# Patient Record
Sex: FEMALE | Race: BLACK OR AFRICAN AMERICAN | NOT HISPANIC OR LATINO | Employment: FULL TIME | URBAN - NONMETROPOLITAN AREA
[De-identification: names, ages, dates, MRNs, and addresses within clinical notes are randomized per-mention and may not be internally consistent; named-entity substitution may affect disease eponyms.]

---

## 2023-12-19 ENCOUNTER — OFFICE VISIT (OUTPATIENT)
Dept: OTOLARYNGOLOGY | Facility: CLINIC | Age: 38
End: 2023-12-19
Payer: COMMERCIAL

## 2023-12-19 DIAGNOSIS — J34.3 NASAL TURBINATE HYPERTROPHY: Primary | ICD-10-CM

## 2023-12-19 DIAGNOSIS — J34.2 DEVIATED NASAL SEPTUM: ICD-10-CM

## 2023-12-19 PROCEDURE — 31231 NASAL ENDOSCOPY DX: CPT | Performed by: OTOLARYNGOLOGY

## 2023-12-19 PROCEDURE — 99204 OFFICE O/P NEW MOD 45 MIN: CPT | Performed by: OTOLARYNGOLOGY

## 2023-12-19 RX ORDER — FLUTICASONE PROPIONATE 50 MCG
2 SPRAY, SUSPENSION (ML) NASAL DAILY
Qty: 16 G | Refills: 3 | Status: SHIPPED | OUTPATIENT
Start: 2023-12-19 | End: 2024-01-12

## 2023-12-19 NOTE — PROGRESS NOTES
History Of Present Illness    Jennifer Mathews is a 38 y.o. female presenting with stuffy nose, sinus issues. She is a self referral. These have been going on for the past 3-4 years. She has tried using roderick pot, and a humidifier in her room. She is congested every night and every morning she is blowing blood out of her nose. She takes zyrtec or claritin almost every day.     Sinus pressure (+)  She snores at night.     On examination, nasal septum is deviated to the left touching left inferior turbinate. There were dried secretions at anterior part of nasal cavity. Inactive bleeding spot (+).    Dx:  1- nasal septum deviation  2- nasal turbinate congestion-hypertrophy    Recommendations:  1- try fluticasone nasal spray   2- coconut oil for dryness at the entrance of nose and for nosebleeds  3- consider septoplasty and radiofrequency turbinate reduction or radiofrequency alone     Past Medical History  She has no past medical history on file.    Surgical History  She has no past surgical history on file.     Social History  She has no history on file for tobacco use, alcohol use, and drug use.    Family History  No family history on file.     Allergies  Patient has no allergy information on record.    Review of Systems   Feeling tired  Sinus Pressure  Nasal Blockage  Snoring  Postnasal Drip  Dry Mouth/ Mouth Breathing  Palpitations  Headache     Physical Exam    General appearance: Healthy-appearing, well-nourished, well groomed, in no acute distress.     Head and Face: Atraumatic with no masses, lesions, or scarring.      Salivary glands: No tenderness of the parotid glands or parotid masses.     No tenderness of the submandibular glands or submandibular masses.      Facial strength: Normal strength and symmetry, no synkinesis or facial tic.     Eyes: Conjunctivas look non-hyperemic bilaterally    Ears: Bilaterally ear canals look normal. Tympanic membranes look intact, no hyperemia, fluid or retraction. Hearing  grossly normal.      Nose: Please see endoscopy    Oral Cavity/Mouth: Lips and tongue look normal.     Throat: No postnasal discharge. No tonsil hypertrophy. No hyperemia.    Neck: Symmetrical, trachea midline.     Pulmonary: Normal respiratory effort.     Lymphatic: No palpable pathologic lymph nodes at neck.     Neurological/Psychiatric Orientation to person, place, and time: Normal.     Mood and affect: Normal.      Extremities: No clubbing.     Skin: No significant skin lesions were noted at face or neck      Procedure  NASAL ENDOSCOPY   0 degree nasal endoscope was advanced through patient's nasal cavities. On examination, nasal septum is deviated to the left touching left inferior turbinate. There were dried secretions at anterior part of nasal cavity. Inactive bleeding spot (+). Mucosa looked normal.  No polyps or purulent secretions were observed.     Last Recorded Vitals  There were no vitals taken for this visit.    Relevant Results  Prior to Admission medications    Not on File     No results found.      Assessment/Plan   Jennifer Mathews is a 38 y.o. female presenting with stuffy nose, sinus issues. She is a self referral. These have been going on for the past 3-4 years. She has tried using roderick pot, and a humidifier in her room. She is congested every night and every morning she is blowing blood out of her nose. She takes zyrtec or claritin almost every day.     Sinus pressure (+)  She snores at night.     On examination, nasal septum is deviated to the left touching left inferior turbinate. There were dried secretions at anterior part of nasal cavity. Inactive bleeding spot (+).    Dx:  1- nasal septum deviation  2- nasal turbinate congestion-hypertrophy    Recommendations:  1- try fluticasone nasal spray   2- coconut oil for dryness at the entrance of nose and for nosebleeds  3- consider septoplasty and radiofrequency turbinate reduction or radiofrequency alone      Marilu Chacon  Otolaryngology -  Head & Neck Surgery

## 2024-01-10 DIAGNOSIS — J34.3 NASAL TURBINATE HYPERTROPHY: ICD-10-CM

## 2024-01-10 DIAGNOSIS — J34.2 DEVIATED NASAL SEPTUM: ICD-10-CM

## 2024-01-12 RX ORDER — FLUTICASONE PROPIONATE 50 MCG
2 SPRAY, SUSPENSION (ML) NASAL DAILY
Qty: 16 ML | Refills: 2 | Status: SHIPPED | OUTPATIENT
Start: 2024-01-12 | End: 2024-05-03 | Stop reason: WASHOUT

## 2024-01-22 ENCOUNTER — APPOINTMENT (OUTPATIENT)
Dept: RADIOLOGY | Facility: HOSPITAL | Age: 39
End: 2024-01-22
Payer: COMMERCIAL

## 2024-01-22 ENCOUNTER — HOSPITAL ENCOUNTER (EMERGENCY)
Facility: HOSPITAL | Age: 39
Discharge: HOME | End: 2024-01-22
Attending: EMERGENCY MEDICINE
Payer: COMMERCIAL

## 2024-01-22 VITALS
BODY MASS INDEX: 33.33 KG/M2 | HEIGHT: 69 IN | OXYGEN SATURATION: 100 % | RESPIRATION RATE: 18 BRPM | WEIGHT: 225 LBS | DIASTOLIC BLOOD PRESSURE: 79 MMHG | SYSTOLIC BLOOD PRESSURE: 136 MMHG | HEART RATE: 70 BPM

## 2024-01-22 DIAGNOSIS — S29.019A THORACIC MYOFASCIAL STRAIN, INITIAL ENCOUNTER: ICD-10-CM

## 2024-01-22 DIAGNOSIS — S39.012A ACUTE MYOFASCIAL STRAIN OF LUMBAR REGION, INITIAL ENCOUNTER: ICD-10-CM

## 2024-01-22 DIAGNOSIS — S16.1XXA CERVICAL STRAIN, ACUTE, INITIAL ENCOUNTER: ICD-10-CM
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
## 2024-01-22 PROCEDURE — 70450 CT HEAD/BRAIN W/O DYE: CPT

## 2024-01-22 PROCEDURE — 72128 CT CHEST SPINE W/O DYE: CPT

## 2024-01-22 PROCEDURE — 72125 CT NECK SPINE W/O DYE: CPT | Performed by: RADIOLOGY

## 2024-01-22 PROCEDURE — 72131 CT LUMBAR SPINE W/O DYE: CPT

## 2024-01-22 PROCEDURE — 72128 CT CHEST SPINE W/O DYE: CPT | Performed by: RADIOLOGY

## 2024-01-22 PROCEDURE — 70450 CT HEAD/BRAIN W/O DYE: CPT | Performed by: RADIOLOGY

## 2024-01-22 PROCEDURE — 99285 EMERGENCY DEPT VISIT HI MDM: CPT | Mod: 25,27

## 2024-01-22 PROCEDURE — 99285 EMERGENCY DEPT VISIT HI MDM: CPT | Performed by: EMERGENCY MEDICINE

## 2024-01-22 PROCEDURE — 72125 CT NECK SPINE W/O DYE: CPT

## 2024-01-22 PROCEDURE — 2500000001 HC RX 250 WO HCPCS SELF ADMINISTERED DRUGS (ALT 637 FOR MEDICARE OP): Performed by: EMERGENCY MEDICINE

## 2024-01-22 RX ORDER — IBUPROFEN 600 MG/1
600 TABLET ORAL EVERY 8 HOURS PRN
Qty: 30 TABLET | Refills: 0 | Status: SHIPPED | OUTPATIENT
Start: 2024-01-22 | End: 2024-05-03 | Stop reason: WASHOUT

## 2024-01-22 RX ORDER — LOSARTAN POTASSIUM 25 MG/1
12.5 TABLET ORAL DAILY
COMMUNITY
End: 2024-05-03 | Stop reason: WASHOUT

## 2024-01-22 RX ORDER — IBUPROFEN 600 MG/1
600 TABLET ORAL ONCE
Status: COMPLETED | OUTPATIENT
Start: 2024-01-22 | End: 2024-01-22

## 2024-01-22 RX ADMIN — IBUPROFEN 600 MG: 600 TABLET ORAL at 19:25

## 2024-01-22 ASSESSMENT — PAIN SCALES - GENERAL
PAINLEVEL_OUTOF10: 7
PAINLEVEL_OUTOF10: 5 - MODERATE PAIN

## 2024-01-22 ASSESSMENT — COLUMBIA-SUICIDE SEVERITY RATING SCALE - C-SSRS
6. HAVE YOU EVER DONE ANYTHING, STARTED TO DO ANYTHING, OR PREPARED TO DO ANYTHING TO END YOUR LIFE?: NO
2. HAVE YOU ACTUALLY HAD ANY THOUGHTS OF KILLING YOURSELF?: NO
1. IN THE PAST MONTH, HAVE YOU WISHED YOU WERE DEAD OR WISHED YOU COULD GO TO SLEEP AND NOT WAKE UP?: NO

## 2024-01-22 ASSESSMENT — PAIN - FUNCTIONAL ASSESSMENT: PAIN_FUNCTIONAL_ASSESSMENT: 0-10
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
## 2024-01-23 NOTE — DISCHARGE INSTRUCTIONS
Imaging of the head, C-spine, T-spine, L-spine is unremarkable.  No acute bony injury.  You may take Tylenol and/or ibuprofen as needed for pain.  Use ice or heat as needed for pain.  Follow-up with primary care in 3 to 5 days as needed,

## 2024-04-19 PROBLEM — E66.812 OBESITY, CLASS II, BMI 35-39.9: Status: ACTIVE | Noted: 2023-10-02

## 2024-04-19 PROBLEM — J34.2 DEVIATED NASAL SEPTUM: Status: ACTIVE | Noted: 2024-04-19

## 2024-04-19 PROBLEM — I10 HYPERTENSION: Status: ACTIVE | Noted: 2024-04-19

## 2024-04-19 PROBLEM — J34.3 HYPERTROPHY OF NASAL TURBINATES: Status: ACTIVE | Noted: 2024-04-19

## 2024-04-19 PROBLEM — R73.03 PREDIABETES: Status: ACTIVE | Noted: 2024-04-19

## 2024-04-19 PROBLEM — E78.5 DYSLIPIDEMIA: Status: ACTIVE | Noted: 2024-04-19

## 2024-04-19 PROBLEM — E66.9 OBESITY, CLASS II, BMI 35-39.9: Status: ACTIVE | Noted: 2023-10-02

## 2024-04-19 NOTE — PROGRESS NOTES
Subjective   Patient ID: Jennifer Mathews is a 38 y.o. female who presents for Establish Care.    HPI   ~Pt here to establish.  She has htn and is on meds.   ~She also has prediabetes.  At home readings are 120-130/80's.  Doing exercising and watching the carbs/starches.    ~She's had issues with her weight : was given phentermine in the past but it caused palpitations and it didn't work.  Is eating 2732-6131 calories a day; goes to gym for 60 min 4x/wk with treadmill, weight, stair stepper activities.  Works from home so is sedentary while working.  ~Has also had some palpitations on and off for awhile: caffeine makes it worse; isn't sleeping very well; tsh checked? Had holter monitor x 72 hrs some time back but it didn't catch anything    Past Medical History:   Diagnosis Date    Dyslipidemia     Hypertension     Prediabetes      Current Outpatient Medications   Medication Sig Dispense Refill    losartan-hydrochlorothiazide (Hyzaar) 50-12.5 mg tablet Take 1 tablet by mouth once daily.       No current facility-administered medications for this visit.       Review of Systems   Constitutional: Negative.  Negative for activity change, fatigue and unexpected weight change.   HENT: Negative.     Respiratory: Negative.     Cardiovascular:  Positive for palpitations.   Gastrointestinal: Negative.    Musculoskeletal: Negative.    Neurological: Negative.        Objective   /68   Pulse 72   Wt 113 kg (249 lb 9.6 oz)   LMP 04/25/2024   SpO2 99%   BMI 36.86 kg/m²   LABS:  Lab Results   Component Value Date    HGBA1C 5.9 (H) 10/02/2023   LIPID PANEL BASIC  Order: [id number]  Component  Ref Range & Units 3 mo ago   Cholesterol, Total  <200 mg/dL 166   Comment: <200 mg/dL, Desirable   200-239 mg/dL, Borderline high  >239 mg/dL, High   Triglyceride  <150 mg/dL 171 High    Comment: <150 mg/dL, Normal   150-199 mg/dL, Borderline high   200-499 mg/dL, High  >499 mg/dL, Very high   HDL Cholesterol  >39 mg/dL 39 Low     Comment:  40-59 mg/dL, Acceptable  >59 mg/dL, High: Negative risk factor for coronary heart disease  <40 mg/dL, Low: Positive risk factor for coronary heart disease   Non HDL Cholesterol  <130 mg/dL 127   Comment: <130 mg/dL, Optimal   130-159 mg/dL, Near optimal/above optimal   160-189 mg/dL, Borderline high   190-219 mg/dL, High  >219 mg/dL, Very high  Secondary prevention optimal non HDL Cholesterol levels are recommended to be <100 mg/dL   Fasting Time  hrs 0   VLDL Cholesterol  <30 mg/dL 34 High    TC:HDL Ratio  <5.10 4.26   LDL Cholesterol  <100 mg/dL 93   Comment: <100 mg/dL, Optimal   100-129 mg/dL, Near optimal/above optimal   130-159 mg/dL, Borderline high   160-189 mg/dL, High  >189 mg/dL, Very high  Secondary prevention optimal LDL Cholesterol levels are recommended to be < 70 mg/dL   LDL:HDL Ratio  <2.54 2.38   COMP METABOLIC PANEL  Order: [id number]  Component  Ref Range & Units 6 mo ago   Protein, Total  6.3 - 8.0 g/dL 7.5   Albumin  3.9 - 4.9 g/dL 4.8   Calcium, Total  8.5 - 10.2 mg/dL 9.7   Bilirubin, Total  0.2 - 1.3 mg/dL 0.5   Alkaline Phosphatase  34 - 123 U/L 85   AST  13 - 35 U/L 14   ALT  7 - 38 U/L 11   Glucose  74 - 99 mg/dL 86   Comment: The American Diabetes Association (ADA) provides guidance for cutoff values for fasting glucose and random glucose. The ADA defines fasting as no caloric intake for at least 8 hours. Fasting plasma glucose results between 100 to 125 mg/dL indicate increased risk for diabetes (prediabetes).  Fasting plasma glucose results greater than or equal to 126 mg/dL meet the criteria for diagnosis of diabetes. In the absence of unequivocal hyperglycemia, results should be confirmed by repeat testing. In a patient with classic symptoms of hyperglycemia or hyperglycemic crisis, random plasma glucose results greater than or equal to 200 mg/dL meet the criteria for diagnosis of diabetes.  Reference: Standards of Medical Care in Diabetes 2016, American Diabetes  Association. Diabetes Care. 2016.39(Suppl 1).   BUN  7 - 21 mg/dL 9   Creatinine  0.58 - 0.96 mg/dL 0.67   Sodium  136 - 144 mmol/L 140   Potassium  3.7 - 5.1 mmol/L 3.8   Chloride  97 - 105 mmol/L 102   CO2  22 - 30 mmol/L 24   Anion Gap  9 - 18 mmol/L 14   Estimated Glomerular Filtration Rate        Physical Exam  Vitals reviewed.   Constitutional:       Appearance: Normal appearance.   HENT:      Head: Normocephalic and atraumatic.      Right Ear: Tympanic membrane, ear canal and external ear normal.      Left Ear: Tympanic membrane, ear canal and external ear normal.      Mouth/Throat:      Mouth: Mucous membranes are moist.   Eyes:      Pupils: Pupils are equal, round, and reactive to light.   Cardiovascular:      Rate and Rhythm: Normal rate and regular rhythm.      Heart sounds: Normal heart sounds.   Pulmonary:      Effort: Pulmonary effort is normal.      Breath sounds: Normal breath sounds.   Musculoskeletal:         General: Normal range of motion.      Cervical back: Normal range of motion.   Skin:     General: Skin is warm.   Neurological:      General: No focal deficit present.      Mental Status: She is alert and oriented to person, place, and time.      Deep Tendon Reflexes: Reflexes are normal and symmetric.   Psychiatric:         Mood and Affect: Mood normal.         Behavior: Behavior normal.         Assessment/Plan   Problem List Items Addressed This Visit             ICD-10-CM       Medium    Dyslipidemia E78.5    Hypertension - Primary I10    Relevant Orders    Follow Up In Primary Care - Established    Prediabetes R73.03     Other Visit Diagnoses         Codes    Palpitations     R00.2    if labs are normal. will refer her for event monitor/zio patch since palpitations aren't daily or even weekly; avoid caffeine; get 6-8 hrs sleep/night     Relevant Orders    TSH with reflex to Free T4 if abnormal    CBC    Basic Metabolic Panel    Magnesium    Weight gain     R63.5    d/w pt to cont gym  visits; eat 2677-1457 calories a day; use calorie tracker on phone     Relevant Orders    TSH with reflex to Free T4 if abnormal          Cont diet /exercise for lipids and prediabetes.  Cont losartin/hctz for htn.

## 2024-05-03 ENCOUNTER — OFFICE VISIT (OUTPATIENT)
Dept: PRIMARY CARE | Facility: CLINIC | Age: 39
End: 2024-05-03
Payer: COMMERCIAL

## 2024-05-03 VITALS
DIASTOLIC BLOOD PRESSURE: 68 MMHG | HEART RATE: 72 BPM | SYSTOLIC BLOOD PRESSURE: 134 MMHG | WEIGHT: 249.6 LBS | BODY MASS INDEX: 36.86 KG/M2 | OXYGEN SATURATION: 99 %

## 2024-05-03 DIAGNOSIS — R00.2 PALPITATIONS: ICD-10-CM

## 2024-05-03 DIAGNOSIS — R73.03 PREDIABETES: ICD-10-CM

## 2024-05-03 DIAGNOSIS — I10 PRIMARY HYPERTENSION: Primary | ICD-10-CM

## 2024-05-03 DIAGNOSIS — R63.5 WEIGHT GAIN: ICD-10-CM

## 2024-05-03 DIAGNOSIS — E78.5 DYSLIPIDEMIA: ICD-10-CM

## 2024-05-03 PROCEDURE — 3078F DIAST BP <80 MM HG: CPT | Performed by: FAMILY MEDICINE

## 2024-05-03 PROCEDURE — 3075F SYST BP GE 130 - 139MM HG: CPT | Performed by: FAMILY MEDICINE

## 2024-05-03 PROCEDURE — 99204 OFFICE O/P NEW MOD 45 MIN: CPT | Performed by: FAMILY MEDICINE

## 2024-05-03 PROCEDURE — 1036F TOBACCO NON-USER: CPT | Performed by: FAMILY MEDICINE

## 2024-05-03 RX ORDER — PHENTERMINE HYDROCHLORIDE 15 MG/1
CAPSULE ORAL
COMMUNITY
Start: 2024-01-16 | End: 2024-05-03 | Stop reason: WASHOUT

## 2024-05-03 RX ORDER — BUDESONIDE AND FORMOTEROL FUMARATE DIHYDRATE 160; 4.5 UG/1; UG/1
AEROSOL RESPIRATORY (INHALATION)
COMMUNITY
Start: 2023-11-14 | End: 2024-05-03 | Stop reason: WASHOUT

## 2024-05-03 RX ORDER — LOSARTAN POTASSIUM AND HYDROCHLOROTHIAZIDE 12.5; 5 MG/1; MG/1
1 TABLET ORAL DAILY
COMMUNITY

## 2024-05-03 RX ORDER — AMLODIPINE BESYLATE 10 MG/1
10 TABLET ORAL DAILY
COMMUNITY
Start: 2023-12-13 | End: 2024-05-03 | Stop reason: WASHOUT

## 2024-05-03 ASSESSMENT — PATIENT HEALTH QUESTIONNAIRE - PHQ9
SUM OF ALL RESPONSES TO PHQ9 QUESTIONS 1 AND 2: 0
1. LITTLE INTEREST OR PLEASURE IN DOING THINGS: NOT AT ALL
2. FEELING DOWN, DEPRESSED OR HOPELESS: NOT AT ALL

## 2024-05-03 ASSESSMENT — ENCOUNTER SYMPTOMS
RESPIRATORY NEGATIVE: 1
ACTIVITY CHANGE: 0
GASTROINTESTINAL NEGATIVE: 1
NEUROLOGICAL NEGATIVE: 1
FATIGUE: 0
MUSCULOSKELETAL NEGATIVE: 1
PALPITATIONS: 1
UNEXPECTED WEIGHT CHANGE: 0
CONSTITUTIONAL NEGATIVE: 1

## 2024-05-03 ASSESSMENT — PAIN SCALES - GENERAL: PAINLEVEL: 0-NO PAIN

## 2024-05-21 NOTE — PROGRESS NOTES
Subjective   Patient ID: Jennifer Mathews is a 39 y.o. female who presents for Numbness (Left arm numbness for a few weeks ).    HPI   Pt comes in c.o of left arm numbness; 4-5 days ago she woke up with left arm numb since she had been sleeping on her left side.  She did that to avoid disturbing her partner who had just had wrist surgery.  Arm still feels weak and gets intermittent pain. The numbness is at night though she isn't sleeping on her left side.  The pain is described as a dull ache.  No dropping of items.    Past Medical History:   Diagnosis Date    Dyslipidemia     Hypertension     Prediabetes      Current Outpatient Medications   Medication Sig Dispense Refill    losartan-hydrochlorothiazide (Hyzaar) 50-12.5 mg tablet Take 1 tablet by mouth once daily.      methylPREDNISolone (Medrol Dospak) 4 mg tablets Take each day's dose once a day in the AM with food 1 each 0     No current facility-administered medications for this visit.       Review of Systems see hpi    Objective   /72   Pulse 70   Wt 112 kg (247 lb)   LMP 04/25/2024   SpO2 99%   BMI 36.48 kg/m²     Physical Exam  Vitals reviewed.   Constitutional:       Appearance: Normal appearance.   Musculoskeletal:      Left shoulder: Tenderness present. No swelling. Normal range of motion. Decreased strength.      Right hand: Normal.      Left hand: Normal.      Cervical back: Normal.      Comments: No pain with hawkin's; pain with empty can test, external rotation, lift off test.   Neurological:      Mental Status: She is alert.      Deep Tendon Reflexes: Reflexes are normal and symmetric.      Comments: Normal  bilaterally         Assessment/Plan   Problem List Items Addressed This Visit    None  Visit Diagnoses         Codes    Chronic left shoulder pain    -  Primary M25.512, G89.29    Relevant Medications    methylPREDNISolone (Medrol Dospak) 4 mg tablets    methylPREDNISolone acetate (DEPO-Medrol) injection 80 mg (Completed)     Other Relevant Orders    XR shoulder left 2+ views

## 2024-05-24 ENCOUNTER — HOSPITAL ENCOUNTER (OUTPATIENT)
Dept: RADIOLOGY | Facility: CLINIC | Age: 39
Discharge: HOME | End: 2024-05-24
Payer: COMMERCIAL

## 2024-05-24 ENCOUNTER — OFFICE VISIT (OUTPATIENT)
Dept: PRIMARY CARE | Facility: CLINIC | Age: 39
End: 2024-05-24
Payer: COMMERCIAL

## 2024-05-24 VITALS
OXYGEN SATURATION: 99 % | WEIGHT: 247 LBS | HEART RATE: 70 BPM | DIASTOLIC BLOOD PRESSURE: 72 MMHG | SYSTOLIC BLOOD PRESSURE: 126 MMHG | BODY MASS INDEX: 36.48 KG/M2

## 2024-05-24 DIAGNOSIS — G89.29 CHRONIC LEFT SHOULDER PAIN: Primary | ICD-10-CM

## 2024-05-24 DIAGNOSIS — G89.29 CHRONIC LEFT SHOULDER PAIN: ICD-10-CM

## 2024-05-24 DIAGNOSIS — M25.512 CHRONIC LEFT SHOULDER PAIN: ICD-10-CM

## 2024-05-24 DIAGNOSIS — M25.512 CHRONIC LEFT SHOULDER PAIN: Primary | ICD-10-CM

## 2024-05-24 PROCEDURE — 99213 OFFICE O/P EST LOW 20 MIN: CPT | Performed by: FAMILY MEDICINE

## 2024-05-24 PROCEDURE — 73030 X-RAY EXAM OF SHOULDER: CPT | Mod: LEFT SIDE | Performed by: RADIOLOGY

## 2024-05-24 PROCEDURE — 3074F SYST BP LT 130 MM HG: CPT | Performed by: FAMILY MEDICINE

## 2024-05-24 PROCEDURE — 96372 THER/PROPH/DIAG INJ SC/IM: CPT | Performed by: FAMILY MEDICINE

## 2024-05-24 PROCEDURE — 73030 X-RAY EXAM OF SHOULDER: CPT | Mod: LT

## 2024-05-24 PROCEDURE — 2500000004 HC RX 250 GENERAL PHARMACY W/ HCPCS (ALT 636 FOR OP/ED): Performed by: FAMILY MEDICINE

## 2024-05-24 PROCEDURE — 3078F DIAST BP <80 MM HG: CPT | Performed by: FAMILY MEDICINE

## 2024-05-24 PROCEDURE — 1036F TOBACCO NON-USER: CPT | Performed by: FAMILY MEDICINE

## 2024-05-24 RX ORDER — METHYLPREDNISOLONE ACETATE 80 MG/ML
80 INJECTION, SUSPENSION INTRA-ARTICULAR; INTRALESIONAL; INTRAMUSCULAR; SOFT TISSUE ONCE
Status: COMPLETED | OUTPATIENT
Start: 2024-05-24 | End: 2024-05-24

## 2024-05-24 RX ORDER — METHYLPREDNISOLONE 4 MG/1
TABLET ORAL
Qty: 1 EACH | Refills: 0 | Status: SHIPPED | OUTPATIENT
Start: 2024-05-24

## 2024-05-24 RX ADMIN — METHYLPREDNISOLONE ACETATE 80 MG: 80 INJECTION, SUSPENSION INTRA-ARTICULAR; INTRALESIONAL; INTRAMUSCULAR; SOFT TISSUE at 11:06

## 2024-05-24 ASSESSMENT — PATIENT HEALTH QUESTIONNAIRE - PHQ9
2. FEELING DOWN, DEPRESSED OR HOPELESS: NOT AT ALL
1. LITTLE INTEREST OR PLEASURE IN DOING THINGS: NOT AT ALL
SUM OF ALL RESPONSES TO PHQ9 QUESTIONS 1 AND 2: 0

## 2024-05-24 ASSESSMENT — PAIN SCALES - GENERAL: PAINLEVEL: 0-NO PAIN

## 2024-05-27 DIAGNOSIS — G89.29 CHRONIC LEFT SHOULDER PAIN: Primary | ICD-10-CM

## 2024-05-27 DIAGNOSIS — M25.512 CHRONIC LEFT SHOULDER PAIN: Primary | ICD-10-CM

## 2024-07-08 NOTE — PROGRESS NOTES
dsdSubjective   Patient ID: Jennifer Mathews is a 39 y.o. female who presents for Rapid Heart Rate.    HPI   Pt comes in due to noticing elevated HR and palpitations  at random times:  while out of town 117  4 days ago.  Occurred on Friday and y'day just sitting on couch.  No excess caffeine.    Past visits show pulse in 70's.  She was seen in May for htn, palpitations but did not get labwork that was ordered (bmp, cbc, tsh, magnesium ).  She was going to be referred for event monitor if lab work came back normal since previous 72 hr holter did not catch anything.  She has been avoiding caffeine due to palpitations    Past Medical History:   Diagnosis Date    Dyslipidemia     Hypertension     Prediabetes      Current Outpatient Medications   Medication Sig Dispense Refill    losartan-hydrochlorothiazide (Hyzaar) 50-12.5 mg tablet Take 1 tablet by mouth once daily.       No current facility-administered medications for this visit.   '  Review of Systems see hpi    Objective   /66   Pulse 95   Wt 115 kg (252 lb 12.8 oz)   SpO2 99%   BMI 37.33 kg/m²     Physical Exam  Vitals reviewed.   Constitutional:       Appearance: Normal appearance.   Cardiovascular:      Rate and Rhythm: Normal rate and regular rhythm.      Heart sounds: Normal heart sounds.   Pulmonary:      Effort: Pulmonary effort is normal.      Breath sounds: Normal breath sounds.   Neurological:      Mental Status: She is alert.         Assessment/Plan   Problem List Items Addressed This Visit    None  Visit Diagnoses         Codes    Tachycardia    -  Primary R00.0    pt to get bloodwork done that was ordered back in May and again if normal, will send for event monitor or start beta blocker for both htn/tachy/palp tx     Palpitations     R00.2

## 2024-07-09 ENCOUNTER — OFFICE VISIT (OUTPATIENT)
Dept: PRIMARY CARE | Facility: CLINIC | Age: 39
End: 2024-07-09
Payer: COMMERCIAL

## 2024-07-09 VITALS
OXYGEN SATURATION: 99 % | WEIGHT: 252.8 LBS | SYSTOLIC BLOOD PRESSURE: 122 MMHG | BODY MASS INDEX: 37.33 KG/M2 | DIASTOLIC BLOOD PRESSURE: 66 MMHG | HEART RATE: 95 BPM

## 2024-07-09 DIAGNOSIS — R00.2 PALPITATIONS: ICD-10-CM

## 2024-07-09 DIAGNOSIS — R00.0 TACHYCARDIA: Primary | ICD-10-CM

## 2024-07-09 PROCEDURE — 1036F TOBACCO NON-USER: CPT | Performed by: FAMILY MEDICINE

## 2024-07-09 PROCEDURE — 99213 OFFICE O/P EST LOW 20 MIN: CPT | Performed by: FAMILY MEDICINE

## 2024-07-09 PROCEDURE — 3078F DIAST BP <80 MM HG: CPT | Performed by: FAMILY MEDICINE

## 2024-07-09 PROCEDURE — 3074F SYST BP LT 130 MM HG: CPT | Performed by: FAMILY MEDICINE

## 2024-07-09 ASSESSMENT — PATIENT HEALTH QUESTIONNAIRE - PHQ9
1. LITTLE INTEREST OR PLEASURE IN DOING THINGS: NOT AT ALL
SUM OF ALL RESPONSES TO PHQ9 QUESTIONS 1 AND 2: 0
2. FEELING DOWN, DEPRESSED OR HOPELESS: NOT AT ALL

## 2024-07-09 ASSESSMENT — PAIN SCALES - GENERAL: PAINLEVEL: 0-NO PAIN

## 2024-07-10 ENCOUNTER — LAB (OUTPATIENT)
Dept: LAB | Facility: LAB | Age: 39
End: 2024-07-10
Payer: COMMERCIAL

## 2024-07-10 DIAGNOSIS — R63.5 WEIGHT GAIN: ICD-10-CM

## 2024-07-10 DIAGNOSIS — R00.2 PALPITATIONS: ICD-10-CM

## 2024-07-10 DIAGNOSIS — D64.9 ANEMIA, UNSPECIFIED TYPE: Primary | ICD-10-CM

## 2024-07-10 DIAGNOSIS — D64.9 ANEMIA, UNSPECIFIED TYPE: ICD-10-CM

## 2024-07-10 LAB
ANION GAP SERPL CALC-SCNC: 13 MMOL/L (ref 10–20)
BUN SERPL-MCNC: 9 MG/DL (ref 6–23)
CALCIUM SERPL-MCNC: 9.7 MG/DL (ref 8.6–10.3)
CHLORIDE SERPL-SCNC: 100 MMOL/L (ref 98–107)
CO2 SERPL-SCNC: 29 MMOL/L (ref 21–32)
CREAT SERPL-MCNC: 0.71 MG/DL (ref 0.5–1.05)
EGFRCR SERPLBLD CKD-EPI 2021: >90 ML/MIN/1.73M*2
ERYTHROCYTE [DISTWIDTH] IN BLOOD BY AUTOMATED COUNT: 13.9 % (ref 11.5–14.5)
FERRITIN SERPL-MCNC: 61 NG/ML (ref 8–150)
GLUCOSE SERPL-MCNC: 104 MG/DL (ref 74–99)
HCT VFR BLD AUTO: 36.4 % (ref 36–46)
HGB BLD-MCNC: 11.4 G/DL (ref 12–16)
IRON SATN MFR SERPL: 17 % (ref 25–45)
IRON SERPL-MCNC: 71 UG/DL (ref 35–150)
MAGNESIUM SERPL-MCNC: 2.45 MG/DL (ref 1.6–2.4)
MCH RBC QN AUTO: 30.2 PG (ref 26–34)
MCHC RBC AUTO-ENTMCNC: 31.3 G/DL (ref 32–36)
MCV RBC AUTO: 97 FL (ref 80–100)
NRBC BLD-RTO: 0 /100 WBCS (ref 0–0)
PLATELET # BLD AUTO: 406 X10*3/UL (ref 150–450)
POTASSIUM SERPL-SCNC: 4 MMOL/L (ref 3.5–5.3)
RBC # BLD AUTO: 3.77 X10*6/UL (ref 4–5.2)
SODIUM SERPL-SCNC: 138 MMOL/L (ref 136–145)
TIBC SERPL-MCNC: 408 UG/DL (ref 240–445)
TSH SERPL-ACNC: 0.95 MIU/L (ref 0.44–3.98)
UIBC SERPL-MCNC: 337 UG/DL (ref 110–370)
WBC # BLD AUTO: 10.5 X10*3/UL (ref 4.4–11.3)

## 2024-07-10 PROCEDURE — 82607 VITAMIN B-12: CPT

## 2024-07-10 PROCEDURE — 36415 COLL VENOUS BLD VENIPUNCTURE: CPT

## 2024-07-10 PROCEDURE — 82746 ASSAY OF FOLIC ACID SERUM: CPT

## 2024-07-11 LAB
FOLATE SERPL-MCNC: 11.3 NG/ML
VIT B12 SERPL-MCNC: 797 PG/ML (ref 211–911)

## 2025-01-18 PROBLEM — G47.33 OSA (OBSTRUCTIVE SLEEP APNEA): Status: ACTIVE | Noted: 2025-01-18

## 2025-01-18 PROBLEM — E66.811 OBESITY, CLASS I, BMI 30-34.9: Status: ACTIVE | Noted: 2025-01-17
